# Patient Record
Sex: FEMALE | URBAN - METROPOLITAN AREA
[De-identification: names, ages, dates, MRNs, and addresses within clinical notes are randomized per-mention and may not be internally consistent; named-entity substitution may affect disease eponyms.]

---

## 2017-07-10 ENCOUNTER — IMPORTED ENCOUNTER (OUTPATIENT)
Dept: URBAN - METROPOLITAN AREA CLINIC 1 | Facility: CLINIC | Age: 22
End: 2017-07-10

## 2017-07-10 PROBLEM — H52.13: Noted: 2017-07-10

## 2017-07-10 PROCEDURE — S0621 ROUTINE OPHTHALMOLOGICAL EXA: HCPCS

## 2017-07-10 NOTE — PATIENT DISCUSSION
1. Myopia: Rx was given for correction if indicated and requested. 2. [id number]-HM[facility] patient to start using AT BID OU 3. Return for an appointment in 1 week for Contact lens check. with Dr. Raissa Jordan. 4.  Return for an appointment in 1 year for 38 and Contact lens check. with Dr. Raissa Jordan.

## 2017-07-13 ENCOUNTER — IMPORTED ENCOUNTER (OUTPATIENT)
Dept: URBAN - METROPOLITAN AREA CLINIC 1 | Facility: CLINIC | Age: 22
End: 2017-07-13

## 2018-07-10 ENCOUNTER — IMPORTED ENCOUNTER (OUTPATIENT)
Dept: URBAN - METROPOLITAN AREA CLINIC 1 | Facility: CLINIC | Age: 23
End: 2018-07-10

## 2018-07-10 PROBLEM — H44.23: Noted: 2018-07-10

## 2018-07-10 PROCEDURE — S0621 ROUTINE OPHTHALMOLOGICAL EXA: HCPCS

## 2018-07-10 NOTE — PATIENT DISCUSSION
1. Myopia: Rx was given for correction if indicated and requested. 2. Dry Eyes OU - Recommend ATs BID-TID OU routinely CTL trials given Recommend OTC readers to wear over CTLs to help relax the eyes for long periods of near work (reading/studying)Return for an appointment in 1 week cc with Dr. Rocio Triplett.

## 2018-07-17 ENCOUNTER — IMPORTED ENCOUNTER (OUTPATIENT)
Dept: URBAN - METROPOLITAN AREA CLINIC 1 | Facility: CLINIC | Age: 23
End: 2018-07-17

## 2018-07-17 NOTE — PATIENT DISCUSSION
1.  CC today - Good fit good comfort. Va doing well finalized CTL RXReturn for an appointment in 1 year 40/cc with Dr. Julia Myrick.

## 2019-01-21 ENCOUNTER — IMPORTED ENCOUNTER (OUTPATIENT)
Dept: URBAN - METROPOLITAN AREA CLINIC 1 | Facility: CLINIC | Age: 24
End: 2019-01-21

## 2019-01-21 PROBLEM — H44.23: Noted: 2019-01-21

## 2019-01-21 PROCEDURE — S0621 ROUTINE OPHTHALMOLOGICAL EXA: HCPCS

## 2019-01-21 NOTE — PATIENT DISCUSSION
1. Myopia: Rx was given for correction if indicated and requested. 2. Dry Eyes OU - Recommend ATs BID-TID OU routinely Finalized CTL RXReturn for an appointment in 1 year 40/cc with Dr. Carmen Manley.

## 2019-01-21 NOTE — PATIENT DISCUSSION
Dry Eyes OU - Recommend ATs BID-TID OU routinely Finalized CTL RXReturn for an appointment in 1 year 40/cc with Dr. Marcela France.

## 2022-04-02 ASSESSMENT — TONOMETRY
OD_IOP_MMHG: 20
OS_IOP_MMHG: 19
OS_IOP_MMHG: 20
OD_IOP_MMHG: 18
OD_IOP_MMHG: 19
OS_IOP_MMHG: 17

## 2022-04-02 ASSESSMENT — VISUAL ACUITY
OS_GLARE: 20/25
OS_SC: 20/40
OD_SC: 20/20
OD_SC: 20/20
OD_GLARE: 20/30
OS_CC: J1+
OS_SC: 20/20
OS_CC: J1+
OS_SC: 20/20
OS_SC: 20/20-1
OD_CC: J1+
OD_SC: 20/20
OD_SC: 20/50
OD_CC: J1+